# Patient Record
Sex: FEMALE | Race: WHITE | NOT HISPANIC OR LATINO | Employment: UNEMPLOYED
[De-identification: names, ages, dates, MRNs, and addresses within clinical notes are randomized per-mention and may not be internally consistent; named-entity substitution may affect disease eponyms.]

---

## 2017-09-03 ENCOUNTER — HEALTH MAINTENANCE LETTER (OUTPATIENT)
Age: 52
End: 2017-09-03

## 2018-08-07 ENCOUNTER — RECORDS - HEALTHEAST (OUTPATIENT)
Dept: ADMINISTRATIVE | Facility: OTHER | Age: 53
End: 2018-08-07

## 2018-08-17 ENCOUNTER — RECORDS - HEALTHEAST (OUTPATIENT)
Dept: ADMINISTRATIVE | Facility: OTHER | Age: 53
End: 2018-08-17

## 2018-08-17 ENCOUNTER — HOSPITAL ENCOUNTER (OUTPATIENT)
Dept: RADIOLOGY | Facility: HOSPITAL | Age: 53
Discharge: HOME | End: 2018-08-17

## 2018-08-17 DIAGNOSIS — R13.10 DYSPHAGIA: ICD-10-CM

## 2019-06-28 ASSESSMENT — MIFFLIN-ST. JEOR: SCORE: 1856.97

## 2019-07-02 ENCOUNTER — ANESTHESIA - HEALTHEAST (OUTPATIENT)
Dept: SURGERY | Facility: CLINIC | Age: 54
End: 2019-07-02

## 2019-07-02 ENCOUNTER — SURGERY - HEALTHEAST (OUTPATIENT)
Dept: SURGERY | Facility: CLINIC | Age: 54
End: 2019-07-02

## 2019-07-02 ASSESSMENT — MIFFLIN-ST. JEOR: SCORE: 1850.05

## 2019-11-07 ENCOUNTER — HEALTH MAINTENANCE LETTER (OUTPATIENT)
Age: 54
End: 2019-11-07

## 2020-02-23 ENCOUNTER — HEALTH MAINTENANCE LETTER (OUTPATIENT)
Age: 55
End: 2020-02-23

## 2020-11-29 ENCOUNTER — HEALTH MAINTENANCE LETTER (OUTPATIENT)
Age: 55
End: 2020-11-29

## 2021-05-22 ENCOUNTER — HOSPITAL ENCOUNTER (EMERGENCY)
Dept: EMERGENCY MEDICINE | Facility: HOSPITAL | Age: 56
Discharge: HOME | End: 2021-05-23
Attending: EMERGENCY MEDICINE
Payer: COMMERCIAL

## 2021-05-22 DIAGNOSIS — E83.42 HYPOMAGNESEMIA: ICD-10-CM

## 2021-05-22 DIAGNOSIS — I44.2 ACCELERATED IDIOVENTRICULAR RHYTHM (H): ICD-10-CM

## 2021-05-22 LAB
ANION GAP SERPL CALCULATED.3IONS-SCNC: 9 MMOL/L (ref 5–18)
BUN SERPL-MCNC: 14 MG/DL (ref 8–22)
CALCIUM SERPL-MCNC: 8.9 MG/DL (ref 8.5–10.5)
CHLORIDE BLD-SCNC: 111 MMOL/L (ref 98–107)
CO2 SERPL-SCNC: 23 MMOL/L (ref 22–31)
CREAT SERPL-MCNC: 0.82 MG/DL (ref 0.6–1.1)
ERYTHROCYTE [DISTWIDTH] IN BLOOD BY AUTOMATED COUNT: 14.7 % (ref 11–14.5)
GFR SERPL CREATININE-BSD FRML MDRD: >60 ML/MIN/1.73M2
GLUCOSE BLD-MCNC: 126 MG/DL (ref 70–125)
HCT VFR BLD AUTO: 40.9 % (ref 35–47)
HGB BLD-MCNC: 13.2 G/DL (ref 12–16)
MAGNESIUM SERPL-MCNC: 1.6 MG/DL (ref 1.8–2.6)
MCH RBC QN AUTO: 26.5 PG (ref 27–34)
MCHC RBC AUTO-ENTMCNC: 32.3 G/DL (ref 32–36)
MCV RBC AUTO: 82 FL (ref 80–100)
PLATELET # BLD AUTO: 311 THOU/UL (ref 140–440)
PMV BLD AUTO: 9.6 FL (ref 8.5–12.5)
POTASSIUM BLD-SCNC: 4.2 MMOL/L (ref 3.5–5)
RBC # BLD AUTO: 4.98 MILL/UL (ref 3.8–5.4)
SODIUM SERPL-SCNC: 143 MMOL/L (ref 136–145)
TSH SERPL DL<=0.005 MIU/L-ACNC: 1.1 UIU/ML (ref 0.3–5)
WBC: 9.8 THOU/UL (ref 4–11)

## 2021-05-22 ASSESSMENT — MIFFLIN-ST. JEOR: SCORE: 1871.82

## 2021-05-23 LAB
ATRIAL RATE - MUSE: 113 BPM
ATRIAL RATE - MUSE: 75 BPM
DIASTOLIC BLOOD PRESSURE - MUSE: NORMAL
DIASTOLIC BLOOD PRESSURE - MUSE: NORMAL
INTERPRETATION ECG - MUSE: NORMAL
INTERPRETATION ECG - MUSE: NORMAL
P AXIS - MUSE: 55 DEGREES
P AXIS - MUSE: NORMAL
PR INTERVAL - MUSE: 228 MS
PR INTERVAL - MUSE: NORMAL
QRS DURATION - MUSE: 88 MS
QRS DURATION - MUSE: 90 MS
QT - MUSE: 364 MS
QT - MUSE: 422 MS
QTC - MUSE: 471 MS
QTC - MUSE: 471 MS
R AXIS - MUSE: 54 DEGREES
R AXIS - MUSE: 60 DEGREES
SYSTOLIC BLOOD PRESSURE - MUSE: NORMAL
SYSTOLIC BLOOD PRESSURE - MUSE: NORMAL
T AXIS - MUSE: 35 DEGREES
T AXIS - MUSE: 46 DEGREES
VENTRICULAR RATE- MUSE: 101 BPM
VENTRICULAR RATE- MUSE: 75 BPM

## 2021-05-27 ENCOUNTER — RECORDS - HEALTHEAST (OUTPATIENT)
Dept: ADMINISTRATIVE | Facility: CLINIC | Age: 56
End: 2021-05-27

## 2021-05-28 ENCOUNTER — RECORDS - HEALTHEAST (OUTPATIENT)
Dept: ADMINISTRATIVE | Facility: CLINIC | Age: 56
End: 2021-05-28

## 2021-05-30 NOTE — ANESTHESIA POSTPROCEDURE EVALUATION
Patient: Chris Carlin  RIGHT CERVICAL 5-6 CERVICAL DISC REPLACMENT  Anesthesia type: general    Patient location: PACU  Last vitals:   Vitals Value Taken Time   /69 7/2/2019  2:10 PM   Temp 36.8  C (98.3  F) 7/2/2019  1:50 PM   Pulse 98 7/2/2019  2:16 PM   Resp 14 7/2/2019  2:16 PM   SpO2 97 % 7/2/2019  2:16 PM   Vitals shown include unvalidated device data.  Post vital signs: stable  Level of consciousness: awake and responds to simple questions  Post-anesthesia pain: pain controlled  Post-anesthesia nausea and vomiting: no  Pulmonary: unassisted, return to baseline  Cardiovascular: stable and blood pressure at baseline  Hydration: adequate  Anesthetic events: no    QCDR Measures:  ASA# 11 - Macrina-op Cardiac Arrest: ASA11B - Patient did NOT experience unanticipated cardiac arrest  ASA# 12 - Macrina-op Mortality Rate: ASA12B - Patient did NOT die  ASA# 13 - PACU Re-Intubation Rate: ASA13B - Patient did NOT require a new airway mgmt  ASA# 10 - Composite Anes Safety: ASA10A - No serious adverse event    Additional Notes:

## 2021-05-30 NOTE — ANESTHESIA CARE TRANSFER NOTE
Last vitals: T 98.3  Vitals:    07/02/19 1351   BP: 156/72   Pulse: 98   Resp: 15   Temp:    SpO2: 96%     Patient's level of consciousness is awake  Spontaneous respirations: yes  Maintains airway independently: yes  Dentition unchanged: yes  Oropharynx: oropharynx clear of all foreign objects    QCDR Measures:  ASA# 20 - Surgical Safety Checklist: WHO surgical safety checklist completed prior to induction    PQRS# 430 - Adult PONV Prevention: 4558F - Pt received => 2 anti-emetic agents (different classes) preop & intraop  ASA# 8 - Peds PONV Prevention: 4558F - Pt received => 2 anti-emetic agents (different classes) preop & intraop  PQRS# 424 - Macrina-op Temp Management: 4559F - At least one body temp DOCUMENTED => 35.5C or 95.9F within required timeframe  PQRS# 426 - PACU Transfer Protocol: - Transfer of care checklist used  ASA# 14 - Acute Post-op Pain: ASA14B - Patient did NOT experience pain >= 7 out of 10

## 2021-05-30 NOTE — ANESTHESIA PREPROCEDURE EVALUATION
Anesthesia Evaluation      Patient summary reviewed   No history of anesthetic complications     Airway   Mallampati: II  Neck ROM: limited   Pulmonary - normal exam    breath sounds clear to auscultation  (+) asthma  sleep apnea on CPAP, ,   (-) shortness of breath, not a smoker                         Cardiovascular - normal exam  Exercise tolerance: > or = 4 METS  (-) angina, murmur  ECG reviewed  Rhythm: regular  Rate: normal,    no murmur      Neuro/Psych    (+) neuromuscular disease,  depression,     Comments: migraine    Endo/Other    (+) diabetes mellitus, arthritis, obesity,   (-) not pregnant     GI/Hepatic/Renal    (+) GERD,       Comments: PUD          Dental - normal exam                        Anesthesia Plan  Planned anesthetic: general endotracheal    ASA 3   Induction: intravenous   Anesthetic plan and risks discussed with: patient, parent/guardian and child/children  Anesthesia plan special considerations: antiemetics,   Post-op plan: other (ANTONIO orders)

## 2021-06-02 ENCOUNTER — RECORDS - HEALTHEAST (OUTPATIENT)
Dept: ADMINISTRATIVE | Facility: CLINIC | Age: 56
End: 2021-06-02

## 2021-06-03 VITALS — HEIGHT: 64 IN | WEIGHT: 282.2 LBS | BODY MASS INDEX: 48.18 KG/M2

## 2021-06-17 NOTE — ED TRIAGE NOTES
Pt reports hx of SVT. Pt started feeling palpitations at about 19:30. Denies chest pain or shortness of breath. Denies dizziness. Pt took an extra dose of Metoprolol at 19:30.

## 2021-06-17 NOTE — ED PROVIDER NOTES
EMERGENCY DEPARTMENT ENCOUNTER      NAME: Chris Carlin  AGE: 55 y.o. female  YOB: 1965  MRN: [id number]  EVALUATION DATE & TIME: 5/22/2021 10:30 PM    PCP: Eduardo Parnell MD    ED PROVIDER: Mariposa Marte MD    Chief Complaint   Patient presents with     Palpitations         FINAL IMPRESSION:  1. Accelerated idioventricular rhythm (H)    2. Hypomagnesemia          ED COURSE & MEDICAL DECISION MAKING:    10:33 PM Met with patient for initial interview and exam. Plan for care in the ED was discussed. I was wearing PPE throughout this encounter including surgical mask, goggles and gloves.    11:55 PM I reevaluated the patient and updated her on results. Plan for discharge discussed. Patient is agreeable.     Pertinent Labs & Imaging studies reviewed. (See chart for details)  55 y.o. female with history of DM, ANTONIO, GERD who presents to the Emergency Department for evaluation of palpitations since this evening.  Really no other associated symptoms.  Did have a GI illness earlier in the week with diarrhea resolved.  Differential includes arrhythmia, dehydration, electrolyte abnormality, thyroid abnormality.    Patient placed on monitor, IV established and blood obtained.  Twelve-lead EKG shows accelerated ventricular rhythm.  Patient is on metoprolol.  Given additional dose of 5 mg metoprolol IV here.  CBC, BMP, magnesium and TSH notable for mild hypomagnesemia replaced IV.  After the metoprolol patient's heart rate normalized and her rhythm converted to sinus rhythm.  Will be discharged home to follow-up with her cardiologist to [facility].    At the conclusion of the encounter I discussed the results of all of the tests and the disposition. The questions were answered. The patient or family acknowledged understanding and was agreeable with the care plan.       MEDICATIONS GIVEN IN THE EMERGENCY:  Medications   sodium chloride flush 10 mL (NS) (has no administration in time range)    magnesium sulfate in water 2 gram/50 mL (4 %) IVPB 2 g (2 g Intravenous New Bag 5/22/21 4205)   metoprolol tartrate injection 5 mg (LOPRESSOR) (5 mg Intravenous Given 5/22/21 4306)       NEW PRESCRIPTIONS STARTED AT TODAY'S ER VISIT  Current Discharge Medication List      CONTINUE these medications which have NOT CHANGED    Details   acetaminophen (TYLENOL) 500 MG tablet Take 1,000 mg by mouth every 6 (six) hours as needed for pain.      albuterol (PROVENTIL HFA;VENTOLIN HFA) 90 mcg/actuation inhaler Inhale 2 puffs every 4 (four) hours as needed for wheezing.      azelaic acid (FINACEA) 15 % gel Apply topically 2 (two) times a day. After skin is thoroughly washed and patted dry, gently but thoroughly massage a thin film of azelaic acid cream into the affected area twice daily, in the morning and evening.      cetirizine (ZYRTEC) 10 MG tablet Take 10 mg by mouth daily.      cholecalciferol, vitamin D3, 1,000 unit tablet Take 3,000 Units by mouth daily.      docusate sodium (COLACE) 100 MG capsule Take 1 capsule (100 mg total) by mouth 2 (two) times a day as needed for constipation.  Refills: 0    Associated Diagnoses: Status post cervical disc replacement      esomeprazole (NEXIUM) 40 MG capsule Take 40 mg by mouth daily before breakfast.             ESTROGENS, CONJUGATED (PREMARIN VAGL) Insert 1 application into the vagina daily as needed.      gabapentin (NEURONTIN) 300 MG capsule Take 600 mg by mouth 2 (two) times a day.             metoprolol tartrate (LOPRESSOR) 50 MG tablet Take 50 mg by mouth daily as needed (uses only prn for SVT).       metroNIDAZOLE (METROGEL) 0.75 % gel Apply 1 application topically daily as needed.             OMEGA-3/DHA/EPA/FISH OIL (FISH OIL-OMEGA-3 FATTY ACIDS) 300-1,000 mg capsule Take 1 g by mouth daily.      oxybutynin (DITROPAN XL) 10 MG ER tablet Take 10 mg by mouth daily.      oxyCODONE (ROXICODONE) 5 MG immediate release tablet Take 1-2 tablets (5-10 mg total) by mouth every  4 (four) hours as needed.  Qty: 30 tablet, Refills: 0    Associated Diagnoses: Status post cervical disc replacement      PARoxetine (PAXIL) 40 MG tablet Take 40 mg by mouth every morning.      polyethylene glycol (MIRALAX) 17 gram packet Take 1 packet (17 g total) by mouth daily as needed.  Refills: 0    Associated Diagnoses: Status post cervical disc replacement      propylene glycol (SYSTANE COMPLETE) 0.6 % Drop Administer 1 drop to both eyes 4 (four) times a day as needed.      selenium sulfide 2.25 % Sham Apply 1 application topically as needed (2 times per week.).       simethicone 125 mg Tab Take 1 capsule by mouth 4 (four) times a day as needed.      sulfacetamide sodium 10 % Susp Apply topically 2 (two) times a day.      tretinoin (RETIN-A) 0.1 % cream Apply 1 application topically every other day. At bedtime.      triamcinolone (KENALOG) 0.5 % cream Apply 1 application topically 3 (three) times a day as needed.             vortioxetine (TRINTELLIX) 10 mg Tab tablet Take 10 mg by mouth daily.                =================================================================    HPI    Patient information was obtained from: Patient    Use of Intrepreter: N/A        Chris Carlin is a 55 y.o. female with pertinent history of SVT (history of previous cardioversion), asthma, diabetes, heart murmur, who presents to the emergency department via walk in for evaluation of palpitations.    Patient reports onset of palpitations around 1930 tonight. She reports that her heart rate at home was 115 bpm. She took an extra dose of metoprolol tartrate at 1930 with mild improvement. She notes some mild shortness of breath. She denies any lightheadedness, dizziness or chest pain. She notes previous history of SVT and has required cardioversion once. No history of atrial fibrillation or thyroid issues.     She does note that she recently had COVID-19. She reports that her appetite has improved and diarrhea, other symptoms have  resolved.     No other physical complaints.     REVIEW OF SYSTEMS   Constitutional:  Denies fever, chills, weight loss or weakness  HENT:  Denies sore throat, ear pain, congestion   Respiratory: No wheeze or cough.Positive for mild shortness of breath.  Cardiovascular:  No CP. Positive for palpitations.  GI:  Denies abdominal pain, nausea, vomiting, diarrhea  All other systems negative unless noted in HPI.      PAST MEDICAL HISTORY:  Past Medical History:   Diagnosis Date     Acid reflux      Acute blood loss anemia      Allergic rhinitis      Anemia      Asthma      Depression      Diabetes mellitus (H)     pre diabetic     DJD (degenerative joint disease)      Heart murmur      Migraine without intractable migraine      Overactive bladder      Prediabetes      Rosacea      Sleep apnea     CPAP nightly       PAST SURGICAL HISTORY:  Past Surgical History:   Procedure Laterality Date      SECTION       DILATION AND CURETTAGE OF UTERUS       HYSTERECTOMY       perforated stomach ulcer surgery       IA KNEE SCOPE,SINGLE MENISECTOMY Right 2015    Procedure: KNEE ARTHROSCOPY, RIGHT Removal of Loose Bodies and Partial Medial Meniscectomy ;  Surgeon: Ciro Frances MD;  Location: [facility];  Service: Orthopedics     PUBOVAGINAL SLING       TONSILLECTOMY             CURRENT MEDICATIONS:    No current facility-administered medications on file prior to encounter.      Current Outpatient Medications on File Prior to Encounter   Medication Sig     acetaminophen (TYLENOL) 500 MG tablet Take 1,000 mg by mouth every 6 (six) hours as needed for pain.     albuterol (PROVENTIL HFA;VENTOLIN HFA) 90 mcg/actuation inhaler Inhale 2 puffs every 4 (four) hours as needed for wheezing.     azelaic acid (FINACEA) 15 % gel Apply topically 2 (two) times a day. After skin is thoroughly washed and patted dry, gently but thoroughly massage a thin film of azelaic acid cream into the affected area twice daily, in the morning  and evening.     cetirizine (ZYRTEC) 10 MG tablet Take 10 mg by mouth daily.     cholecalciferol, vitamin D3, 1,000 unit tablet Take 3,000 Units by mouth daily.     docusate sodium (COLACE) 100 MG capsule Take 1 capsule (100 mg total) by mouth 2 (two) times a day as needed for constipation.     esomeprazole (NEXIUM) 40 MG capsule Take 40 mg by mouth daily before breakfast.            ESTROGENS, CONJUGATED (PREMARIN VAGL) Insert 1 application into the vagina daily as needed.     gabapentin (NEURONTIN) 300 MG capsule Take 600 mg by mouth 2 (two) times a day.            metoprolol tartrate (LOPRESSOR) 50 MG tablet Take 50 mg by mouth daily as needed (uses only prn for SVT).      metroNIDAZOLE (METROGEL) 0.75 % gel Apply 1 application topically daily as needed.            OMEGA-3/DHA/EPA/FISH OIL (FISH OIL-OMEGA-3 FATTY ACIDS) 300-1,000 mg capsule Take 1 g by mouth daily.     oxybutynin (DITROPAN XL) 10 MG ER tablet Take 10 mg by mouth daily.     oxyCODONE (ROXICODONE) 5 MG immediate release tablet Take 1-2 tablets (5-10 mg total) by mouth every 4 (four) hours as needed.     PARoxetine (PAXIL) 40 MG tablet Take 40 mg by mouth every morning.     polyethylene glycol (MIRALAX) 17 gram packet Take 1 packet (17 g total) by mouth daily as needed.     propylene glycol (SYSTANE COMPLETE) 0.6 % Drop Administer 1 drop to both eyes 4 (four) times a day as needed.     selenium sulfide 2.25 % Sham Apply 1 application topically as needed (2 times per week.).      simethicone 125 mg Tab Take 1 capsule by mouth 4 (four) times a day as needed.     sulfacetamide sodium 10 % Susp Apply topically 2 (two) times a day.     tretinoin (RETIN-A) 0.1 % cream Apply 1 application topically every other day. At bedtime.     triamcinolone (KENALOG) 0.5 % cream Apply 1 application topically 3 (three) times a day as needed.            vortioxetine (TRINTELLIX) 10 mg Tab tablet Take 10 mg by mouth daily.       ALLERGIES:  Allergies   Allergen Reactions      Nitrofurantoin Macrocrystal Hives     Adhesive Tape-Silicones Other (See Comments)     redness     Nickel Other (See Comments)     cysts     Penicillins Hives     Sulfa (Sulfonamide Antibiotics) Hives       FAMILY HISTORY:  Family History   Problem Relation Age of Onset     Diabetes Mother      Anesthesia problems Neg Hx        SOCIAL HISTORY:   Social History     Socioeconomic History     Marital status: Single     Spouse name: Not on file     Number of children: Not on file     Years of education: Not on file     Highest education level: Not on file   Occupational History     Not on file   Social Needs     Financial resource strain: Not on file     Food insecurity     Worry: Not on file     Inability: Not on file     Transportation needs     Medical: Not on file     Non-medical: Not on file   Tobacco Use     Smoking status: Never Smoker     Smokeless tobacco: Never Used   Substance and Sexual Activity     Alcohol use: Yes     Comment: Rare     Drug use: No     Sexual activity: Not on file   Lifestyle     Physical activity     Days per week: Not on file     Minutes per session: Not on file     Stress: Not on file   Relationships     Social connections     Talks on phone: Not on file     Gets together: Not on file     Attends Denominational service: Not on file     Active member of club or organization: Not on file     Attends meetings of clubs or organizations: Not on file     Relationship status: Not on file     Intimate partner violence     Fear of current or ex partner: Not on file     Emotionally abused: Not on file     Physically abused: Not on file     Forced sexual activity: Not on file   Other Topics Concern     Not on file   Social History Narrative     Not on file       VITALS:  Patient Vitals for the past 24 hrs:   BP Temp Temp src Pulse Resp SpO2 Height Weight   05/22/21 2300 117/59 -- -- 75 16 97 % -- --   05/22/21 2249 -- -- -- -- 16 -- -- --   05/22/21 2248 160/68 -- -- 99 -- 97 % -- --   05/22/21  "2235 -- 98  F (36.7  C) Oral -- 16 -- 5' 4\" (1.626 m) (!) 287 lb (130.2 kg)   05/22/21 2234 -- -- -- (!) 101 -- 98 % -- --   05/22/21 2232 145/67 -- -- (!) 104 -- 98 % -- --       PHYSICAL EXAM    General Appearance: Well-appearing, well-nourished, no acute distress. Obese female.  Head:  Normocephalic  Eyes:   conjunctiva/corneas clear  ENT:  membranes are moist without pallor  Neck:  Supple  Cardio:  Mild tachycardia 100s-110s. Regular rhythm, no murmur/gallop/rub, 2+ pulses symmetric in all extremities  Pulm:  No respiratory distress, clear to auscultation bilaterally  Back:  No CVA tenderness, normal ROM  Abdomen: Morbidly obese  Extremities: Moves all extremities normally, normal gait  Skin:  Skin warm, dry, no rashes  Neuro:  Alert and oriented ×3, moving all extremities, no gross sensory defects       LAB:  All pertinent labs reviewed and interpreted.  Results for orders placed or performed during the hospital encounter of 05/22/21   Basic metabolic panel   Result Value Ref Range    Sodium 143 136 - 145 mmol/L    Potassium 4.2 3.5 - 5.0 mmol/L    Chloride 111 (H) 98 - 107 mmol/L    CO2 23 22 - 31 mmol/L    Anion Gap, Calculation 9 5 - 18 mmol/L    Glucose 126 (H) 70 - 125 mg/dL    Calcium 8.9 8.5 - 10.5 mg/dL    BUN 14 8 - 22 mg/dL    Creatinine 0.82 0.60 - 1.10 mg/dL    GFR MDRD Af Amer >60 >60 mL/min/1.73m2    GFR MDRD Non Af Amer >60 >60 mL/min/1.73m2   Magnesium   Result Value Ref Range    Magnesium 1.6 (L) 1.8 - 2.6 mg/dL   CBC   Result Value Ref Range    WBC 9.8 4.0 - 11.0 thou/uL    RBC 4.98 3.80 - 5.40 mill/uL    Hemoglobin 13.2 12.0 - 16.0 g/dL    Hematocrit 40.9 35.0 - 47.0 %    MCV 82 80 - 100 fL    MCH 26.5 (L) 27.0 - 34.0 pg    MCHC 32.3 32.0 - 36.0 g/dL    RDW 14.7 (H) 11.0 - 14.5 %    Platelets 311 140 - 440 thou/uL    MPV 9.6 8.5 - 12.5 fL   Thyroid Cascade   Result Value Ref Range    TSH 1.10 0.30 - 5.00 uIU/mL       EKG:    Performed at: 22:33    Impression: accelerated junctional rhythm " with retrograde conduction, ST abnormality, nonspecific.     Rate: 101  Rhythm: accelerated junctional rhythm  SC Interval: *  QRS Interval: 88  QTc Interval: 471  Comparison: compared to previous on 1/21/2021, heart rate is slower    Performed at: 00:25    Impression: sinus rhythm with 1st degree AV block, low voltage QRS    Rate: 75  Rhythm: sinus rhythm  SC Interval: 228  QRS Interval: 90  QTc Interval: 471  Comparison: Normal sinus rhythm has replaced junctional rhythm    I have independently reviewed and interpreted the EKG(s) documented above.      I, Cirilo East, am serving as a scribe to document services personally performed by Dr. Marte based on my observation and the provider's statements to me. I, Mariposa Marte MD attest that Cirilo East is acting in a scribe capacity, has observed my performance of the services and has documented them in accordance with my direction.    Mariposa Marte MD  Emergency Medicine  [facility] EMERGENCY DEPARTMENT  [address]  [facility] [address]  Dept: [phone]  Loc: [phone]        Mariposa Marte MD  05/23/21 0016

## 2021-06-19 NOTE — PROGRESS NOTES
"Speech Language/Pathology  Videofluoroscopic Swallow Study       Problem:  Patient Active Problem List   Diagnosis     S/P total knee arthroplasty     Acid reflux     Sleep apnea     Depression     Asthma     Anemia     Heart murmur     Acute blood loss anemia       Onset Date: 8/7/2018 (order date)  Reason for Evaluation: Assess for dysphagia  Pertinent History: GERD. Patient reports that she takes 40 mg esomeprazole daily for this.  Current Diet: Regular textures and thin liquids  Baseline Diet: Regular textures and thin liquids    Patient is a 53-year-old female referred due to concerns of dysphagia. She reports that \"chewy things\" (e.g., gummy bears) frequently feel as though they get stuck in her throat. She points to her suprasternal notch to indicate where this sensation occurs. Patient also notes that she occasionally \"inhales\" items and then begins to cough. She has experienced this with grains of rice. The purpose of this study is to evaluate the oropharyngeal phase of patient's swallow, determine her aspiration risk, and establish safe swallowing precautions as indicated. Patient also participated in an esophagram during this visit. Please refer to separate report for details.     Patient presents as alert and cooperative during this evaluation.   An  was not applicable    Patient was given nectar-thick, thin, and pureed consistencies of barium.    Oral Phase:    Dentition/Oral hygiene: Patient's dentition is intact. Oral hygiene was good.    Bolus prep and oral control were not impaired.     Anterior-Posterior transit was not impaired.    Premature spillage did not occur with any consistency.    Tongue base retraction was not impaired.    Oral stasis did not occur with any consistency.    Pharyngeal Phase:    Aspiration did not occur with any consistency.     Laryngeal penetration did not occur with any consistency.    Swallow response was timely with all consistencies. Pourover was not " observed with any consistency.    Epiglottic movement was complete consistently across texture trials.    Pharyngeal stasis did not occur with any consistency.    Pharyngeal constriction was not impaired.    Hyolaryngeal elevation was mildly reduced. Hyolaryngeal excursion was mildly reduced.    Cricopharyngeal function was not impaired. Cervical esophageal function was not impaired.    Assessment:    Patient demonstrated no oral dysphagia and no pharyngeal dysphagia.    Patient is at minimal aspiration risk with all intake.    Rehab potential is good based on evaluation results.    Recommendations:    Plan: Continue current diet of Regular textures and thin liquids    Strategies: Patient to follow standard safe swallowing precautions, including sitting fully upright for all intake, eating slowly, and taking small bites and sips. Patient to avoid talking during meals due to increased risk of aspiration with small food particles. Recommend that patient also follow standard GERD precautions. She was provided with verbal education and written handouts on these.    Speech Therapy is not indicated at this time.    Referrals: Patient to continue to follow with [facility] for ongoing symptom management     Reviewed history of swallow problem with patient and verbally explained roles of SLP and radiologist. Verbally explained process of VFSS prior to administration of barium. Verbally explained results and recommendations to patient. SLP answered questions and stated that a copy of this report will be faxed to patient's referring physician, Dr. Valdez of [facility]. Patient verbalized understanding.    25 dysphagia minutes    Margi Landeros MA, CCC-SLP

## 2021-07-06 VITALS — HEIGHT: 64 IN | BODY MASS INDEX: 49 KG/M2 | WEIGHT: 287 LBS

## 2021-09-25 ENCOUNTER — HEALTH MAINTENANCE LETTER (OUTPATIENT)
Age: 56
End: 2021-09-25

## 2021-11-20 ENCOUNTER — HEALTH MAINTENANCE LETTER (OUTPATIENT)
Age: 56
End: 2021-11-20

## 2022-01-15 ENCOUNTER — HEALTH MAINTENANCE LETTER (OUTPATIENT)
Age: 57
End: 2022-01-15

## 2022-04-22 ENCOUNTER — HOSPITAL ENCOUNTER (EMERGENCY)
Facility: HOSPITAL | Age: 57
Discharge: HOME | End: 2022-04-22
Attending: EMERGENCY MEDICINE | Admitting: EMERGENCY MEDICINE
Payer: COMMERCIAL

## 2022-04-22 VITALS
BODY MASS INDEX: 50.64 KG/M2 | TEMPERATURE: 97.7 F | WEIGHT: 293 LBS | DIASTOLIC BLOOD PRESSURE: 87 MMHG | SYSTOLIC BLOOD PRESSURE: 159 MMHG | RESPIRATION RATE: 18 BRPM | OXYGEN SATURATION: 99 % | HEART RATE: 78 BPM

## 2022-04-22 DIAGNOSIS — S05.01XA ABRASION OF RIGHT CORNEA, INITIAL ENCOUNTER: ICD-10-CM

## 2022-04-22 PROCEDURE — 99283 EMERGENCY DEPT VISIT LOW MDM: CPT

## 2022-04-22 PROCEDURE — 250N000009 HC RX 250: Performed by: EMERGENCY MEDICINE

## 2022-04-22 RX ORDER — TOBRAMYCIN 3 MG/ML
2 SOLUTION/ DROPS OPHTHALMIC 4 TIMES DAILY
Qty: 2 ML | Refills: 0 | Status: SHIPPED | OUTPATIENT
Start: 2022-04-22 | End: 2022-04-27

## 2022-04-22 RX ORDER — TETRACAINE HYDROCHLORIDE 5 MG/ML
2 SOLUTION OPHTHALMIC ONCE
Status: COMPLETED | OUTPATIENT
Start: 2022-04-22 | End: 2022-04-22

## 2022-04-22 RX ADMIN — TETRACAINE HYDROCHLORIDE 2 DROP: 5 SOLUTION OPHTHALMIC at 18:54

## 2022-04-22 RX ADMIN — FLUORESCEIN SODIUM 1 STRIP: 1 STRIP OPHTHALMIC at 20:41

## 2022-04-22 NOTE — ED PROVIDER NOTES
ED Provider In Triage Note  [facility]  Encounter Date: Apr 22, 2022    Chief Complaint   Patient presents with     Eye Pain       Brief HPI:   Chris Carlin is a 56 year old female presenting to the Emergency Department with a chief complaint of R eye pain. Possible scratch to eye. Started around 4. No obvious FB. Wears contacts    Brief Physical Exam:  There were no vitals taken for this visit.  General: Non-toxic appearing. PERRL. Minimal photophobia  HEENT: R eye mildly injected  Resp: No respiratory distress  Neuro: Alert, oriented, answers questions appropriately  Psych: Behavior appropriate      Plan Initiated in Triage:  Orders Placed This Encounter     tetracaine (PONTOCAINE) 0.5 % ophthalmic solution 2 drop       PIT Dispo:   RP    Varinder Yousif MD on 4/22/2022 at 6:48 PM    Patient was evaluated by the Physician in Triage due to a limitation of available rooms in the Emergency Department. A plan of care was discussed based on the information obtained on the initial evaluation and patient was consuled to return back to the Emergency Department lobby after this initial evalutaiton until results were obtained or a room became available in the Emergency Department. Patient was counseled not to leave prior to receiving the results of their workup.      Varinder Yousif MD  04/22/22 0196

## 2022-04-22 NOTE — ED TRIAGE NOTES
Started this 1615 PM to have right eye irritation, burning pain.  Thinks it might have a corneal scratch.   R Eye is reddened

## 2022-04-23 NOTE — ED PROVIDER NOTES
EMERGENCY DEPARTMENT ENCOUNTER      NAME: Chris Carlin  AGE: 56 year old female  YOB: 1965  MRN: [id number]  EVALUATION DATE & TIME: 2022  8:14 PM    PCP: Eduardo Parnell    ED PROVIDER: Chen Galvan M.D.      Chief Complaint   Patient presents with     Eye Pain     FINAL IMPRESSION:  1. Abrasion of right cornea, initial encounter      ED COURSE & MEDICAL DECISION MAKIN:29 PM I met with the patient for the initial interview and physical examination. Discussed plan for treatment and workup in the ED.      Pertinent Labs & Imaging studies reviewed. (See chart for details)  ED Course as of 22 Patient is a 56-year-old female who comes in today for evaluation of pain in the right eye.  She wears gas-permeable contacts and feels like something got in there.  It feels like a corneal abrasion.  She is had those before.  She has little sensitivity.  She had some tetracaine placed out in triage and it helped quite a bit.  I did fluorescein stain it and she does have just a punctate abrasion at the 3 o'clock position of the right eye.  We talked about antibiotic drops and not wearing her contacts over the weekend.  I told her if she is not doing significantly better or resolved by Monday then she needs to see the eye doctor because she is at risk of getting a more severe infection.  She is comfortable with that plan.  She otherwise looks well and we will get her discharged home.  Because she is a contact lens user we will cover her with tobramycin drops.       At the conclusion of the encounter I discussed  the results of all of the tests and the disposition with patient.   All questions were answered.  The patient acknowledged understanding and was involved in the decision making regarding the overall care plan.      I discussed with patient the utility, limitations and findings of the exam/interventions/studies done during this visit as well as the list of  differential diagnosis and symptoms to monitor/return to ER for.  Additional verbal discharge instructions were provided.     MEDICATIONS GIVEN IN THE EMERGENCY:  Medications   tetracaine (PONTOCAINE) 0.5 % ophthalmic solution 2 drop (2 drops Right Eye Given 4/22/22 1854)   fluorescein (FUL-LEX) ophthalmic strip 1 strip (1 strip Both Eyes Given by Other 4/22/22 2041)       NEW PRESCRIPTIONS STARTED AT TODAY'S ER VISIT  Discharge Medication List as of 4/22/2022  8:51 PM      START taking these medications    Details   tobramycin (TOBREX) 0.3 % ophthalmic solution Place 2 drops into the right eye 4 times daily for 5 days, Disp-2 mL, R-0, E-Prescribe                =================================================================    HPI    Triage Note: Started this 1615 PM to have right eye irritation, burning pain.  Thinks it might have a corneal scratch.   R Eye is reddened      Patient information was obtained from: Patient     Use of : N/A        Chris Carlin is a 56 year old female with a pertinent medical history of migraine who presents to this ED by walk in for evaluation of right eye pain.    Patient reports that she developed right eye irritation and burning pain around 4 PM today. She is concerned for a scratch to her eye. She states that she wears contact lenses, and she thinks that something might have gotten stuck under her contact lens. Pain improved after tetracaine drops that were given in triage. No other reported symptoms or complaints at this time.         REVIEW OF SYSTEMS   Except as stated in the HPI all other systems reviewed and are negative.    PAST MEDICAL HISTORY:  Past Medical History:   Diagnosis Date     Acid reflux      Acne      Acute blood loss anemia      Adenomatous polyp of colon      Alcohol abuse, in remission      Allergic rhinitis      Anemia      Anemia      Asthma      Asthma      Cardiac murmur      Depression      Depression, major, recurrent (H)      Diabetes  mellitus (H)     pre diabetic     DJD (degenerative joint disease)      Genital warts      GERD (gastroesophageal reflux disease)      Heart murmur      Migraine      Migraine without intractable migraine      ANTONIO (obstructive sleep apnea)      Overactive bladder      Perforated, severe stomach ulcer (H)      Pre-diabetes      Prediabetes      Rosacea      Sleep apnea     CPAP nightly     SVT (supraventricular tachycardia) (H)     reports intermittent, occurs once per year       PAST SURGICAL HISTORY:  Past Surgical History:   Procedure Laterality Date     CERVICECTOMY (TRACHELECTOMY)  2012    Procedure: CERVICECTOMY (TRACHELECTOMY);  Vaginal Tracheolectomy;  Surgeon: Michelle Bravo MD;  Location: [facility] OR      SECTION      [email or web address]      SECTION       DILATION AND CURETTAGE       DILATION AND CURETTAGE       HC KNEE SCOPE,SINGLE MENISECTOMY Right 2015    Procedure: KNEE ARTHROSCOPY, RIGHT Removal of Loose Bodies and Partial Medial Meniscectomy ;  Surgeon: Ciro Frances MD;  Location: [facility];  Service: Orthopedics     HYSTERECTOMY       LAPAROSCOPIC HYSTERECTOMY SUPRACERVICAL  2011    ovaries retained     OTHER SURGICAL HISTORY      perforated stomach ulcer surgery     perforated stomach ulcer  2000    at [facility]     pubovaginal sling  2011     PUBOVAGINAL SLING       REPAIR BLADDER      as a child     TONSILLECTOMY       TONSILLECTOMY & ADENOIDECTOMY       RUST HAND/FINGER SURGERY UNLISTED      x 3       CURRENT MEDICATIONS:    No current facility-administered medications for this encounter.    Current Outpatient Medications:      tobramycin (TOBREX) 0.3 % ophthalmic solution, Place 2 drops into the right eye 4 times daily for 5 days, Disp: 2 mL, Rfl: 0     albuterol 90 MCG/ACT inhaler, Inhale 2 puffs into the lungs every 6 hours as needed., Disp: , Rfl:      cetirizine (ZYRTEC) 10 MG tablet, Take 10 mg by mouth daily., Disp: , Rfl:      cholecalciferol  (VITAMIN D3) 1000 UNITS capsule, Take 1 capsule by mouth daily., Disp: , Rfl:      coenzyme Q-10 200 MG CAPS, Take  by mouth., Disp: , Rfl:      desoximetasone (TOPICORT) 0.25 % cream, Apply 0.5 inches topically 2 times daily. prn, Disp: , Rfl:      hydroquinone 4 % CREA, Externally apply  topically. Prn , Disp: , Rfl:      ibuprofen (ADVIL,MOTRIN) 600 MG tablet, Take 1 tablet by mouth every 6 hours as needed for other (inflammatory pain)., Disp: 40 tablet, Rfl: 0     ISOtretinoin (ACCUTANE) 40 MG capsule, Take 40 mg by mouth 2 times daily., Disp: , Rfl:      LANsoprazole (PREVACID SOLUTAB) 30 MG disintegrating tablet, Take  by mouth daily., Disp: , Rfl:      metoprolol (TOPROL-XL) 25 MG 24 hr tablet, Take 25 mg by mouth daily. prn, Disp: , Rfl:      PARoxetine (PAXIL) 20 MG tablet, Take  by mouth every morning. 2 1/2 tab, Disp: , Rfl:      senna-docusate (SENOKOT-S;PERICOLACE) 8.6-50 MG per tablet, Take 1-2 tablets by mouth 2 times daily., Disp: 60 tablet, Rfl: 5     Simethicone 125 MG TABS, Take  by mouth. 1 - 4 times a day prn, Disp: , Rfl:     ALLERGIES:  Allergies   Allergen Reactions     Nitrofurantoin Hives and Rash     Penicillin G Difficulty breathing     Sulfa Drugs Hives       FAMILY HISTORY:  Family History   Problem Relation Age of Onset     Sjogren's Mother      Lupus Other      Breast Cancer No family hx of      Cancer - colorectal No family hx of      Diabetes Mother      Anesthesia Reaction No family hx of        SOCIAL HISTORY:   Social History     Socioeconomic History     Marital status: Single   Tobacco Use     Smoking status: Never Smoker     Smokeless tobacco: Never Used   Substance and Sexual Activity     Alcohol use: Yes     Comment: Alcoholic Drinks/day: Rare     Drug use: No     Sexual activity: Never     Partners: Male       PHYSICAL EXAM    VITAL SIGNS: BP (!) 159/87   Pulse 78   Temp 97.7  F (36.5  C) (Temporal)   Resp 18   Wt 133.8 kg (295 lb)   SpO2 99%   BMI 50.64 kg/m      GENERAL: Awake, Alert, answering questions, No acute distress, Well nourished  HEENT: Normal cephalic, Atraumatic, bilateral external ears normal. Very small corneal abrasion at the 9 o'clock position over the pupil of the right eye, small amount of fluorescein uptake.  Bilateral retinal vessels are crisp.  Mild conjunctival irritation of the right eye and normal conjunctiva of the left eye. extraocular movements intact.  Mask in place  NECK: No obvious swelling or abnormality, No stridor  EXTREMITIES: Moves all extremities spontaneously, warm, no edema, No major deformities  NEURO: No facial droop, normal motor function, Normal speech   PSYCH: Normal mood and affect  SKIN: No rashes on visualized skin, dry, warm     I, Merissa Gaitan, am serving as a scribe to document services personally performed by Dr. Galvan based on my observation and the provider's statements to me. I, Chen Galvan MD attest that Merissa Gaitan is acting in a scribe capacity, has observed my performance of the services and has documented them in accordance with my direction.    Chen Galvan M.D.  Emergency Medicine  [facility] EMERGENCY DEPARTMENT  [facility] [facility] [address]  [phone]  Dept: [phone]     Chen Galvan MD  04/22/22 4042

## 2022-04-23 NOTE — DISCHARGE INSTRUCTIONS
You were seen in the Emergency Department today for evaluation of right eye pain.  Your exam shows a corneal abrasion.  You were prescribed antibiotic drops. You should take all medications as prescribed.  Follow up with your primary care physician to ensure resolution of symptoms. Return if you have new or worsening symptoms.

## 2022-12-26 ENCOUNTER — HEALTH MAINTENANCE LETTER (OUTPATIENT)
Age: 57
End: 2022-12-26

## 2023-04-16 ENCOUNTER — HEALTH MAINTENANCE LETTER (OUTPATIENT)
Age: 58
End: 2023-04-16

## 2023-04-18 ENCOUNTER — ANCILLARY ORDERS (OUTPATIENT)
Dept: CT IMAGING | Facility: HOSPITAL | Age: 58
End: 2023-04-18

## 2023-04-18 DIAGNOSIS — Z86.0100 PERSONAL HISTORY OF COLONIC POLYPS: ICD-10-CM

## 2023-04-18 DIAGNOSIS — R10.33 INTERMITTENT PERIUMBILICAL ABDOMINAL PAIN: ICD-10-CM

## 2023-04-18 DIAGNOSIS — Z90.49 HISTORY OF CHOLECYSTECTOMY: ICD-10-CM

## 2023-04-22 ENCOUNTER — HOSPITAL ENCOUNTER (OUTPATIENT)
Dept: CT IMAGING | Facility: HOSPITAL | Age: 58
Discharge: HOME | End: 2023-04-22
Attending: INTERNAL MEDICINE | Admitting: INTERNAL MEDICINE
Payer: COMMERCIAL

## 2023-04-22 DIAGNOSIS — Z90.49 HISTORY OF CHOLECYSTECTOMY: ICD-10-CM

## 2023-04-22 DIAGNOSIS — Z86.0100 PERSONAL HISTORY OF COLONIC POLYPS: ICD-10-CM

## 2023-04-22 DIAGNOSIS — R10.33 INTERMITTENT PERIUMBILICAL ABDOMINAL PAIN: ICD-10-CM

## 2023-04-22 LAB
CREAT BLD-MCNC: 0.8 MG/DL (ref 0.6–1.1)
GFR SERPL CREATININE-BSD FRML MDRD: >60 ML/MIN/1.73M2

## 2023-04-22 PROCEDURE — 999N000127 HC STATISTIC PERIPHERAL IV START W US GUIDANCE

## 2023-04-22 PROCEDURE — 250N000011 HC RX IP 250 OP 636: Performed by: INTERNAL MEDICINE

## 2023-04-22 PROCEDURE — 74177 CT ABD & PELVIS W/CONTRAST: CPT

## 2023-04-22 PROCEDURE — 999N000285 HC STATISTIC VASC ACCESS LAB DRAW WITH PIV START

## 2023-04-22 PROCEDURE — 82565 ASSAY OF CREATININE: CPT

## 2023-04-22 RX ORDER — IOPAMIDOL 755 MG/ML
90 INJECTION, SOLUTION INTRAVASCULAR ONCE
Status: COMPLETED | OUTPATIENT
Start: 2023-04-22 | End: 2023-04-22

## 2023-04-22 RX ADMIN — IOPAMIDOL 90 ML: 755 INJECTION, SOLUTION INTRAVENOUS at 16:37

## 2023-05-06 ENCOUNTER — APPOINTMENT (OUTPATIENT)
Dept: CT IMAGING | Facility: HOSPITAL | Age: 58
End: 2023-05-06
Attending: EMERGENCY MEDICINE
Payer: COMMERCIAL

## 2023-05-06 ENCOUNTER — HOSPITAL ENCOUNTER (EMERGENCY)
Facility: HOSPITAL | Age: 58
Discharge: HOME | End: 2023-05-06
Attending: EMERGENCY MEDICINE | Admitting: EMERGENCY MEDICINE
Payer: COMMERCIAL

## 2023-05-06 VITALS
WEIGHT: 285 LBS | OXYGEN SATURATION: 97 % | SYSTOLIC BLOOD PRESSURE: 128 MMHG | TEMPERATURE: 98 F | HEART RATE: 81 BPM | BODY MASS INDEX: 48.92 KG/M2 | DIASTOLIC BLOOD PRESSURE: 68 MMHG | RESPIRATION RATE: 16 BRPM

## 2023-05-06 DIAGNOSIS — R10.84 ABDOMINAL PAIN, GENERALIZED: ICD-10-CM

## 2023-05-06 LAB
ALBUMIN SERPL BCG-MCNC: 3.6 G/DL (ref 3.5–5.2)
ALBUMIN UR-MCNC: 10 MG/DL
ALP SERPL-CCNC: 82 U/L (ref 35–104)
ALT SERPL W P-5'-P-CCNC: 21 U/L (ref 10–35)
ANION GAP SERPL CALCULATED.3IONS-SCNC: 10 MMOL/L (ref 7–15)
APPEARANCE UR: CLEAR
AST SERPL W P-5'-P-CCNC: 31 U/L (ref 10–35)
BACTERIA #/AREA URNS HPF: ABNORMAL /HPF
BILIRUB DIRECT SERPL-MCNC: <0.2 MG/DL (ref 0–0.3)
BILIRUB SERPL-MCNC: 0.3 MG/DL
BILIRUB UR QL STRIP: NEGATIVE
BUN SERPL-MCNC: 12.3 MG/DL (ref 8–23)
CALCIUM SERPL-MCNC: 9.4 MG/DL (ref 8.6–10)
CHLORIDE SERPL-SCNC: 103 MMOL/L (ref 98–107)
COLOR UR AUTO: ABNORMAL
CREAT SERPL-MCNC: 0.79 MG/DL (ref 0.51–0.95)
DEPRECATED HCO3 PLAS-SCNC: 24 MMOL/L (ref 22–29)
ERYTHROCYTE [DISTWIDTH] IN BLOOD BY AUTOMATED COUNT: 14.4 % (ref 10–15)
GFR SERPL CREATININE-BSD FRML MDRD: 86 ML/MIN/1.73M2
GLUCOSE SERPL-MCNC: 125 MG/DL (ref 70–99)
GLUCOSE UR STRIP-MCNC: NEGATIVE MG/DL
HCT VFR BLD AUTO: 41.1 % (ref 35–47)
HGB BLD-MCNC: 13.5 G/DL (ref 11.7–15.7)
HGB UR QL STRIP: NEGATIVE
KETONES UR STRIP-MCNC: NEGATIVE MG/DL
LEUKOCYTE ESTERASE UR QL STRIP: NEGATIVE
LIPASE SERPL-CCNC: 16 U/L (ref 13–60)
MCH RBC QN AUTO: 27.2 PG (ref 26.5–33)
MCHC RBC AUTO-ENTMCNC: 32.8 G/DL (ref 31.5–36.5)
MCV RBC AUTO: 83 FL (ref 78–100)
MUCOUS THREADS #/AREA URNS LPF: PRESENT /LPF
NITRATE UR QL: NEGATIVE
PH UR STRIP: 6 [PH] (ref 5–7)
PLATELET # BLD AUTO: 269 10E3/UL (ref 150–450)
POTASSIUM SERPL-SCNC: 4.7 MMOL/L (ref 3.4–5.3)
PROT SERPL-MCNC: 6.6 G/DL (ref 6.4–8.3)
RBC # BLD AUTO: 4.96 10E6/UL (ref 3.8–5.2)
RBC URINE: 2 /HPF
SODIUM SERPL-SCNC: 137 MMOL/L (ref 136–145)
SP GR UR STRIP: 1.02 (ref 1–1.03)
SQUAMOUS EPITHELIAL: 4 /HPF
UROBILINOGEN UR STRIP-MCNC: <2 MG/DL
WBC # BLD AUTO: 12.6 10E3/UL (ref 4–11)
WBC URINE: 9 /HPF

## 2023-05-06 PROCEDURE — 80053 COMPREHEN METABOLIC PANEL: CPT | Performed by: EMERGENCY MEDICINE

## 2023-05-06 PROCEDURE — 74177 CT ABD & PELVIS W/CONTRAST: CPT

## 2023-05-06 PROCEDURE — 36415 COLL VENOUS BLD VENIPUNCTURE: CPT | Performed by: EMERGENCY MEDICINE

## 2023-05-06 PROCEDURE — 99285 EMERGENCY DEPT VISIT HI MDM: CPT | Mod: 25

## 2023-05-06 PROCEDURE — 81001 URINALYSIS AUTO W/SCOPE: CPT | Performed by: EMERGENCY MEDICINE

## 2023-05-06 PROCEDURE — 82248 BILIRUBIN DIRECT: CPT | Performed by: EMERGENCY MEDICINE

## 2023-05-06 PROCEDURE — 87086 URINE CULTURE/COLONY COUNT: CPT | Performed by: EMERGENCY MEDICINE

## 2023-05-06 PROCEDURE — 250N000011 HC RX IP 250 OP 636: Performed by: EMERGENCY MEDICINE

## 2023-05-06 PROCEDURE — 83690 ASSAY OF LIPASE: CPT | Performed by: EMERGENCY MEDICINE

## 2023-05-06 PROCEDURE — 85027 COMPLETE CBC AUTOMATED: CPT | Performed by: EMERGENCY MEDICINE

## 2023-05-06 RX ORDER — IOPAMIDOL 755 MG/ML
100 INJECTION, SOLUTION INTRAVASCULAR ONCE
Status: COMPLETED | OUTPATIENT
Start: 2023-05-06 | End: 2023-05-06

## 2023-05-06 RX ADMIN — IOPAMIDOL 90 ML: 755 INJECTION, SOLUTION INTRAVENOUS at 19:32

## 2023-05-06 ASSESSMENT — ACTIVITIES OF DAILY LIVING (ADL): ADLS_ACUITY_SCORE: 35

## 2023-05-06 NOTE — ED PROVIDER NOTES
Emergency Department Encounter     Evaluation Date & Time:   No admission date for patient encounter.    CHIEF COMPLAINT:  Abdominal Pain and Nausea & Vomiting      Triage Note:  Pt c/o abdominal pain and nausea for past 2 hours.  Also, pt vomited once pta.  Pt denies diarrhea and denies constipation.  Pt denies any known injury.  Pt's last bowel movement was pta.  Pt took Dicyclomine 45 minutes pta with no relief.     Triage Assessment     Row Name [id number]       Triage Assessment (Adult)    Airway WDL WDL       Respiratory WDL    Respiratory WDL WDL       Skin Circulation/Temperature WDL    Skin Circulation/Temperature WDL WDL       Cardiac WDL    Cardiac WDL WDL       Peripheral/Neurovascular WDL    Peripheral Neurovascular WDL WDL       Cognitive/Neuro/Behavioral WDL    Cognitive/Neuro/Behavioral WDL WDL                    Impression and Plan       FINAL IMPRESSION:    ICD-10-CM    1. Abdominal pain, generalized  R10.84             ED COURSE & MEDICAL DECISION MAKIN:39 PM I met with the patient, obtained history, performed an initial exam, and discussed options and plan for diagnostics and treatment here in the ED.  8:35 PM Checked in on and updated patient. We discussed the plan for discharge and the patient is agreeable. Reviewed supportive cares, symptomatic treatment, outpatient follow up, and reasons to return to the Emergency Department. Patient to be discharged by ED RN.       57 year old female, history of SVT, heart murmur, DM2, GERD, asthma, ANTONIO, mental health diagnoses, s/p  section, s/p cholecystectomy, s/p hysterectomy and with perforated viscus s/p surgery, who presents for evaluation of sharp and squeezing periumbilical abdominal pain that has been constant since onset 2-3 hours ago. She reports associated nausea with one episode of vomiting; pain improved somewhat after vomiting. No diarrhea with normal BM this afternoon. No urinary symptoms or fevers.     She has had similar  pain in the past and was seen by GI ~3 weeks ago for it with no etiology determined.    On exam, abdomen soft with moderate tenderness to palpation of the supraumbilical region and epigastrium and mild tenderness to palpation diffusely otherwise; no peritoneal signs.    IV access established and blood sent for labs.    Labs remarkable for mild leukocytosis (WBC 12.6) with no anemia.  She has no significant electrolyte derangements or renal impairment.  No laboratory evidence of hepatitis, biliary obstruction or pancreatitis.    UA contaminated (4 epi cells's) with moderate bacteria and no other suggestion of infection.  Patient again denies any urinary symptoms and prefers to hold on antibiotics pending urine culture results.    CT abdomen / pelvis performed and demonstrated:  1.  Persistent two small linear metallic densities projecting along the greater curvature of the stomach since 04/22/2023. Correlate for prior gastric post procedural change / clips (perhaps less likely chronic retained foreign bodies).   2.  Stomach is decompressed, exaggerating wall thickness. Otherwise, unremarkable bowel and appendix. No obstruction.  3.  Mild hepatic steatosis.  4.  No free air. No abscess.    Patient discharged to home with follow-up with her Primary Care Provider this upcoming week.  Return precautions provided.  Patient stable throughout ED course.      At the conclusion of the encounter I discussed the results of all the tests and the disposition. The questions were answered. The patient and family acknowledged understanding and were agreeable with the care plan.      Medical Decision Making    History:    Supplemental history from: Documented in chart, if applicable    External Record(s) reviewed: Documented in chart, if applicable.    Work Up:    Chart documentation includes differential considered and any EKGs or imaging independently interpreted by provider, where specified.    In additional to work up documented,  "I considered the following work up: Documented in chart, if applicable.    External consultation:    Discussion of management with another provider: Documented in chart, if applicable    Complicating factors:    Care impacted by chronic illness: Diabetes, Mental Health and Other: GERD and asthma    Care affected by social determinants of health: N/A    Disposition considerations: Discharge. I recommended the patient continue their current prescription strength medication(s): for diabetes and GERD. I considered admission, but ultimately discharged patient Given reassuring vitals, exam and evaluation.    MEDICATIONS GIVEN IN THE EMERGENCY DEPARTMENT:  Medications   iopamidol (ISOVUE-370) solution 100 mL (90 mLs Intravenous $Given 23)       NEW PRESCRIPTIONS STARTED AT TODAY'S ED VISIT:  Discharge Medication List as of 2023  8:49 PM          HPI     HPI     Chris Carlin is a 57 year old female, history of SVT, heart murmur, DM2, GERD, asthma, ANTONIO, mental health diagnoses, s/p  section, s/p cholecystectomy, s/p hysterectomy and with perforated viscus s/p surgery, who presents to this ED by walk-in for evaluation of abdominal pain, nausea and vomiting.     Around 2-2.5 hours ago (~2:30-3:00 PM) patient developed onset of abdominal pain that has been constant since onset. Pain is located in the periumbilical region of her abdomen with radiation diffusely. The pain is sharp in nature and feels as though someone is \"grabbing my intestines\". No provocative features. Pain improved a little after she vomited once. Has only mild ongoing nausea. No diarrhea. Last BM this afternoon. No urinary symptoms. No fevers.     Of note, patient states that she has had similar pain in the past and she thought it was due to adhesions. She was seen by Gastroenterology but states nothing was found. Patient still has her appendix.     Otherwise patient denies chest pain, shortness of breath, cough or any other " concerns.    Per chart review: Patient had a CT of Abdomen and Pelvis with contrast performed on 2023 at [place]. Impression showed no appendicitis. Fatty liver. Previous cholecystectomy and hysterectomy. Otherwise unremarkable.       REVIEW OF SYSTEMS:  All other systems reviewed and are negative.      Medical History     Past Medical History:   Diagnosis Date     Acid reflux      Acne      Acute blood loss anemia      Adenomatous polyp of colon      Alcohol abuse, in remission      Allergic rhinitis      Anemia      Anemia      Asthma      Asthma      Cardiac murmur      Depression      Depression, major, recurrent (H)      Diabetes mellitus (H)      DJD (degenerative joint disease)      Genital warts      GERD (gastroesophageal reflux disease)      Heart murmur      Migraine      Migraine without intractable migraine      ANTONIO (obstructive sleep apnea)      Overactive bladder      Perforated, severe stomach ulcer (H)      Pre-diabetes      Prediabetes      Rosacea      Sleep apnea      SVT (supraventricular tachycardia) (H)        Past Surgical History:   Procedure Laterality Date     CERVICECTOMY (TRACHELECTOMY)  2012    Procedure: CERVICECTOMY (TRACHELECTOMY);  Vaginal Tracheolectomy;  Surgeon: Michelle Bravo MD;  Location: [facility] OR      SECTION      [email or web address]      SECTION       DILATION AND CURETTAGE       DILATION AND CURETTAGE        KNEE SCOPE,SINGLE MENISECTOMY Right 2015    Procedure: KNEE ARTHROSCOPY, RIGHT Removal of Loose Bodies and Partial Medial Meniscectomy ;  Surgeon: Ciro Frances MD;  Location: [facility];  Service: Orthopedics     HYSTERECTOMY       LAPAROSCOPIC HYSTERECTOMY SUPRACERVICAL  2011    ovaries retained     OTHER SURGICAL HISTORY      perforated stomach ulcer surgery     perforated stomach ulcer  2000    at [facility],      pubovaginal sling  2011     PUBOVAGINAL SLING       REPAIR BLADDER      as a child     TONSILLECTOMY        TONSILLECTOMY & ADENOIDECTOMY       ZZC HAND/FINGER SURGERY UNLISTED      x 3       Family History   Problem Relation Age of Onset     Sjogren's Mother      Lupus Other      Breast Cancer No family hx of      Cancer - colorectal No family hx of      Diabetes Mother      Anesthesia Reaction No family hx of        Social History     Tobacco Use     Smoking status: Never     Smokeless tobacco: Never   Substance Use Topics     Alcohol use: Yes     Comment: Alcoholic Drinks/day: Rare     Drug use: No       albuterol 90 MCG/ACT inhaler  cetirizine (ZYRTEC) 10 MG tablet  cholecalciferol (VITAMIN D3) 1000 UNITS capsule  coenzyme Q-10 200 MG CAPS  desoximetasone (TOPICORT) 0.25 % cream  hydroquinone 4 % CREA  ibuprofen (ADVIL,MOTRIN) 600 MG tablet  ISOtretinoin (ACCUTANE) 40 MG capsule  LANsoprazole (PREVACID SOLUTAB) 30 MG disintegrating tablet  metoprolol (TOPROL-XL) 25 MG 24 hr tablet  PARoxetine (PAXIL) 20 MG tablet  senna-docusate (SENOKOT-S;PERICOLACE) 8.6-50 MG per tablet  Simethicone 125 MG TABS        Physical Exam     First Vitals:  Patient Vitals for the past 24 hrs:   BP Temp Temp src Pulse Resp SpO2 Weight   05/06/23 2045 128/68 -- -- 81 -- 97 % --   05/06/23 2000 125/60 -- -- 79 -- 98 % --   05/06/23 1900 124/56 -- -- 76 -- 96 % --   05/06/23 1855 121/57 -- -- 73 -- 95 % --   05/06/23 1737 (!) 141/84 98  F (36.7  C) Oral 80 16 96 % 129.3 kg (285 lb)       PHYSICAL EXAM:   Physical Exam    GENERAL: Awake, alert.  In mild acute distress.   HEENT: Normocephalic, atraumatic. Pupils equal, round and reactive. Conjunctiva normal.   NECK: No stridor.  PULMONARY: Symmetrical breath sounds without distress.  Lungs clear to auscultation bilaterally without wheezes, rhonchi or rales.  CARDIO: Regular rate and rhythm.  No significant murmur, rub or gallop.  Radial pulses strong and symmetrical.  ABDOMINAL: Abdomen soft, non-distended with moderate tenderness to palpation supraumbilical region, and epigatrium and mild  tenderness to palpation diffusely otherwise; no rebound tenderness or guarding. No CVAT, BL.  EXTREMITIES: No lower extremity swelling or edema.      NEURO: Alert and oriented to person, place and time.  Cranial nerves grossly intact.  No focal motor deficit.  PSYCH: Normal mood and affect.      Results     LAB:  All pertinent labs reviewed and interpreted  Labs Ordered and Resulted from Time of ED Arrival to Time of ED Departure   ROUTINE UA WITH MICROSCOPIC REFLEX TO CULTURE - Abnormal       Result Value    Color Urine Light Yellow      Appearance Urine Clear      Glucose Urine Negative      Bilirubin Urine Negative      Ketones Urine Negative      Specific Gravity Urine 1.019      Blood Urine Negative      pH Urine 6.0      Protein Albumin Urine 10 (*)     Urobilinogen Urine <2.0      Nitrite Urine Negative      Leukocyte Esterase Urine Negative      Bacteria Urine Moderate (*)     Mucus Urine Present (*)     RBC Urine 2      WBC Urine 9 (*)     Squamous Epithelials Urine 4 (*)    CBC WITH PLATELETS - Abnormal    WBC Count 12.6 (*)     RBC Count 4.96      Hemoglobin 13.5      Hematocrit 41.1      MCV 83      MCH 27.2      MCHC 32.8      RDW 14.4      Platelet Count 269     BASIC METABOLIC PANEL - Abnormal    Sodium 137      Potassium 4.7      Chloride 103      Carbon Dioxide (CO2) 24      Anion Gap 10      Urea Nitrogen 12.3      Creatinine 0.79      Calcium 9.4      Glucose 125 (*)     GFR Estimate 86     HEPATIC FUNCTION PANEL - Normal    Protein Total 6.6      Albumin 3.6      Bilirubin Total 0.3      Alkaline Phosphatase 82      AST 31      ALT 21      Bilirubin Direct <0.20     LIPASE - Normal    Lipase 16         RADIOLOGY:  CT Abdomen Pelvis w Contrast   Final Result   IMPRESSION:       1.  Persistent two small linear metallic densities projecting along the greater curvature of the stomach since 04/22/2023. Correlate for prior gastric post procedural change / clips (perhaps less likely chronic retained  foreign bodies).       2.  Stomach is decompressed, exaggerating wall thickness. Otherwise, unremarkable bowel and appendix. No obstruction.      3.  Mild hepatic steatosis.      4.  No free air. No abscess.               I, Karen Cannon, am serving as a scribe to document services personally performed by Magnolia Bales MD based on my observation and the provider's statements to me. I, Magnolia Bales MD attest that Karen Cannon is acting in a scribe capacity, has observed my performance of the services and has documented them in accordance with my direction.    Magnolia Bales MD  Emergency Medicine  [facility] EMERGENCY DEPARTMENT         Magnolia Bales MD  05/07/23 [id number]

## 2023-05-06 NOTE — ED TRIAGE NOTES
Pt c/o abdominal pain and nausea for past 2 hours.  Also, pt vomited once pta.  Pt denies diarrhea and denies constipation.  Pt denies any known injury.  Pt's last bowel movement was pta.  Pt took Dicyclomine 45 minutes pta with no relief.     Triage Assessment     Row Name 05/06/23 2375       Triage Assessment (Adult)    Airway WDL WDL       Respiratory WDL    Respiratory WDL WDL       Skin Circulation/Temperature WDL    Skin Circulation/Temperature WDL WDL       Cardiac WDL    Cardiac WDL WDL       Peripheral/Neurovascular WDL    Peripheral Neurovascular WDL WDL       Cognitive/Neuro/Behavioral WDL    Cognitive/Neuro/Behavioral WDL WDL

## 2023-05-07 NOTE — DISCHARGE INSTRUCTIONS
Please follow-up with your Primary Care Provider within 3-5 days for a recheck; call to arrange appointment.    Return to the ER for worsening symptoms, worsening pain, persistent nausea / vomiting, fever or other concerns.

## 2023-05-08 LAB — BACTERIA UR CULT: NORMAL

## 2023-11-26 ENCOUNTER — HEALTH MAINTENANCE LETTER (OUTPATIENT)
Age: 58
End: 2023-11-26

## 2023-12-04 ENCOUNTER — APPOINTMENT (OUTPATIENT)
Dept: RADIOLOGY | Facility: HOSPITAL | Age: 58
End: 2023-12-04
Attending: EMERGENCY MEDICINE
Payer: COMMERCIAL

## 2023-12-04 ENCOUNTER — HOSPITAL ENCOUNTER (EMERGENCY)
Facility: HOSPITAL | Age: 58
Discharge: HOME | End: 2023-12-04
Attending: STUDENT IN AN ORGANIZED HEALTH CARE EDUCATION/TRAINING PROGRAM | Admitting: STUDENT IN AN ORGANIZED HEALTH CARE EDUCATION/TRAINING PROGRAM
Payer: COMMERCIAL

## 2023-12-04 VITALS
RESPIRATION RATE: 20 BRPM | WEIGHT: 285 LBS | HEIGHT: 64 IN | OXYGEN SATURATION: 100 % | TEMPERATURE: 98.4 F | SYSTOLIC BLOOD PRESSURE: 135 MMHG | HEART RATE: 81 BPM | BODY MASS INDEX: 48.65 KG/M2 | DIASTOLIC BLOOD PRESSURE: 71 MMHG

## 2023-12-04 DIAGNOSIS — J45.901 MILD ASTHMA WITH EXACERBATION, UNSPECIFIED WHETHER PERSISTENT: ICD-10-CM

## 2023-12-04 DIAGNOSIS — R05.9 COUGH, UNSPECIFIED TYPE: ICD-10-CM

## 2023-12-04 PROCEDURE — 250N000009 HC RX 250: Performed by: STUDENT IN AN ORGANIZED HEALTH CARE EDUCATION/TRAINING PROGRAM

## 2023-12-04 PROCEDURE — 94640 AIRWAY INHALATION TREATMENT: CPT

## 2023-12-04 PROCEDURE — 71046 X-RAY EXAM CHEST 2 VIEWS: CPT

## 2023-12-04 PROCEDURE — 99283 EMERGENCY DEPT VISIT LOW MDM: CPT | Mod: 25

## 2023-12-04 PROCEDURE — 250N000012 HC RX MED GY IP 250 OP 636 PS 637: Performed by: STUDENT IN AN ORGANIZED HEALTH CARE EDUCATION/TRAINING PROGRAM

## 2023-12-04 RX ORDER — PREDNISONE 20 MG/1
40 TABLET ORAL DAILY
Qty: 8 TABLET | Refills: 0 | Status: SHIPPED | OUTPATIENT
Start: 2023-12-04

## 2023-12-04 RX ORDER — ALBUTEROL SULFATE 0.83 MG/ML
5 SOLUTION RESPIRATORY (INHALATION) ONCE
Status: COMPLETED | OUTPATIENT
Start: 2023-12-04 | End: 2023-12-04

## 2023-12-04 RX ORDER — PREDNISONE 20 MG/1
40 TABLET ORAL DAILY
Qty: 8 TABLET | Refills: 0 | Status: SHIPPED | OUTPATIENT
Start: 2023-12-04 | End: 2023-12-04

## 2023-12-04 RX ORDER — PREDNISONE 20 MG/1
60 TABLET ORAL ONCE
Status: COMPLETED | OUTPATIENT
Start: 2023-12-04 | End: 2023-12-04

## 2023-12-04 RX ADMIN — ALBUTEROL SULFATE 5 MG: 2.5 SOLUTION RESPIRATORY (INHALATION) at 20:28

## 2023-12-04 RX ADMIN — PREDNISONE 60 MG: 20 TABLET ORAL at 20:08

## 2023-12-04 ASSESSMENT — ACTIVITIES OF DAILY LIVING (ADL): ADLS_ACUITY_SCORE: 35

## 2023-12-04 NOTE — ED TRIAGE NOTES
Pt has had a cough and congestion for 2 weeks. [facility] and [facility] sent pt here for a cxr. Sats 97%.  Pmh-asthma.

## 2023-12-05 NOTE — ED PROVIDER NOTES
EMERGENCY DEPARTMENT ENCOUNTER      NAME: Chris Carlin  AGE: 58 year old female  YOB: 1965  MRN: [id number]  EVALUATION DATE & TIME: 12/4/2023  7:09 PM    PCP: Eduardo Parnell    ED PROVIDER: Jamin Sanders MD      Chief Complaint   Patient presents with    Cough         FINAL IMPRESSION:  No diagnosis found.      ED COURSE & MEDICAL DECISION MAKING:    Pertinent Labs & Imaging studies reviewed. (See chart for details)  58 year old female presents to the Emergency Department for evaluation of cough.  Patient reports she has had a week or 2 of cough now started with congestion and mild sore throat.  Congestion and sore throat have since resolved but she still having lingering cough that is occasionally productive.  No fevers.  No significant associated chest pain.  No nausea or vomiting.  No swelling of the lower extremities.  No history of blood clots.  No known sick contacts.    On exam here patient is well-appearing in no acute distress.  She saturating well on room air.  She does have faint bilateral expiratory wheezes with good aeration down to the bases.    Suspect probable viral URI with potential underlying exacerbation of asthma.  Will obtain a chest x-ray to evaluate for any focal consolidation to suggest bacterial pneumonia given the duration of her cough.    Chest x-ray here does not show any acute cardiopulmonary pathology.  Patient did receive a albuterol breathing treatment and first dose of prednisone by mouth here in the emergency department with significant improvement in her wheezing on repeat evaluation.  Discussed the patient she likely have a lingering cough for at least the next few days to perhaps a few weeks.  However, with no new oxygen requirement  and improved wheezing after a dose of albuterol here in the emergency department believe she is safe for discharge home.  Will send her home with an additional 4 days of prednisone and recommend she schedule albuterol puffs every 4-6  hours at home for the next 2 days.  Otherwise if she develops significantly worsening shortness of breath, persistent fevers or other new or concerning symptoms should return to the emergency department for repeat evaluation.         7:51 PM I met and evaluated the patient.     Medical Decision Making    History:  Supplemental history from: Documented in chart, if applicable  External Record(s) reviewed: Documented in chart, if applicable.    Work Up:  Chart documentation includes differential considered and any EKGs or imaging independently interpreted by provider, where specified.  In additional to work up documented, I considered the following work up: Documented in chart, if applicable.    External consultation:  Discussion of management with another provider: Documented in chart, if applicable    Complicating factors:  Care impacted by chronic illness: Chronic Lung Disease  Care affected by social determinants of health: N/A    Disposition considerations: Discharge. I prescribed additional prescription strength medication(s) as charted. See documentation for any additional details.           At the conclusion of the encounter I discussed the results of all of the tests and the disposition. The questions were answered. The patient or family acknowledged understanding and was agreeable with the care plan.     0 minutes of critical care time     MEDICATIONS GIVEN IN THE EMERGENCY:  Medications   albuterol (PROVENTIL) neb solution 5 mg (has no administration in time range)   predniSONE (DELTASONE) tablet 60 mg (has no administration in time range)       NEW PRESCRIPTIONS STARTED AT TODAY'S ER VISIT  New Prescriptions    No medications on file          =================================================================    HPI    Patient information was obtained from: Patient     Use of : N/A       Chris Carlin is a 58 year old female with a pertinent history of mild intermittent asthma, ANTONIO (on CPAP),  "GERD, DM type II who presents to this ED by walk-in for evaluation of cough    The patient reports she was seen earlier today at the \"[facility]\" and told to come to the ED for further workup.   The patient reports she has had a cough for a couple weeks and has been unable to sleep because of it. The patient states sometimes the cough is productive. The patient notes a few weeks ago she had a sore throat and congestion, but notes those symptoms have improved. The patient notes her family members have also been sick, but with different symptoms.   The patient reports a history of mild intermittent asthma that only worsens when she is sick. The patient has been using the inhaler as needed. The patient denies taking HTN medication. The patient denies recent travel, nausea, vomiting, diarrhea, or any other symptoms or complaints.    Social history: The patient denies smoking      REVIEW OF SYSTEMS   Refer to the HPI    PAST MEDICAL HISTORY:  Past Medical History:   Diagnosis Date    Acid reflux     Acne     Acute blood loss anemia     Adenomatous polyp of colon     Alcohol abuse, in remission     Allergic rhinitis     Anemia     Anemia     Asthma     Asthma     Cardiac murmur     Depression     Depression, major, recurrent (H)     Diabetes mellitus (H)     pre diabetic    DJD (degenerative joint disease)     Genital warts     GERD (gastroesophageal reflux disease)     Heart murmur     Migraine     Migraine without intractable migraine     ANTONIO (obstructive sleep apnea)     Overactive bladder     Perforated, severe stomach ulcer (H)     Pre-diabetes     Prediabetes     Rosacea     Sleep apnea     CPAP nightly    SVT (supraventricular tachycardia) (H)     reports intermittent, occurs once per year       PAST SURGICAL HISTORY:  Past Surgical History:   Procedure Laterality Date    CERVICECTOMY (TRACHELECTOMY)  9/13/2012    Procedure: CERVICECTOMY (TRACHELECTOMY);  Vaginal Tracheolectomy;  Surgeon: Michelle Bravo MD;  " Location: [facility] OR     SECTION      [email or web address]     SECTION      DILATION AND CURETTAGE      DILATION AND CURETTAGE      HC KNEE SCOPE,SINGLE MENISECTOMY Right 2015    Procedure: KNEE ARTHROSCOPY, RIGHT Removal of Loose Bodies and Partial Medial Meniscectomy ;  Surgeon: Ciro Frances MD;  Location: [facility];  Service: Orthopedics    HYSTERECTOMY      LAPAROSCOPIC HYSTERECTOMY SUPRACERVICAL  2011    ovaries retained    OTHER SURGICAL HISTORY      perforated stomach ulcer surgery    perforated stomach ulcer  2000    at [facility],     pubovaginal sling  2011    PUBOVAGINAL SLING      REPAIR BLADDER      as a child    TONSILLECTOMY      TONSILLECTOMY & ADENOIDECTOMY      ZZC HAND/FINGER SURGERY UNLISTED      x 3           CURRENT MEDICATIONS:    albuterol 90 MCG/ACT inhaler  cetirizine (ZYRTEC) 10 MG tablet  cholecalciferol (VITAMIN D3) 1000 UNITS capsule  coenzyme Q-10 200 MG CAPS  desoximetasone (TOPICORT) 0.25 % cream  hydroquinone 4 % CREA  ibuprofen (ADVIL,MOTRIN) 600 MG tablet  ISOtretinoin (ACCUTANE) 40 MG capsule  LANsoprazole (PREVACID SOLUTAB) 30 MG disintegrating tablet  metoprolol (TOPROL-XL) 25 MG 24 hr tablet  PARoxetine (PAXIL) 20 MG tablet  senna-docusate (SENOKOT-S;PERICOLACE) 8.6-50 MG per tablet  Simethicone 125 MG TABS        ALLERGIES:  Allergies   Allergen Reactions    Nitrofurantoin Hives and Rash    Penicillin G Difficulty breathing    Sulfa Antibiotics Hives       FAMILY HISTORY:  Family History   Problem Relation Age of Onset    Sjogren's Mother     Lupus Other     Breast Cancer No family hx of     Cancer - colorectal No family hx of     Diabetes Mother     Anesthesia Reaction No family hx of        SOCIAL HISTORY:   Social History     Socioeconomic History    Marital status: Single   Tobacco Use    Smoking status: Never    Smokeless tobacco: Never   Substance and Sexual Activity    Alcohol use: Yes     Comment: Alcoholic Drinks/day: Rare    Drug  "use: No    Sexual activity: Never     Partners: Male       VITALS:  /76   Pulse 82   Temp 98.4  F (36.9  C) (Tympanic)   Resp 20   Ht 1.626 m (5' 4\")   Wt 129.3 kg (285 lb)   SpO2 97%   BMI 48.92 kg/m      PHYSICAL EXAM    Constitutional: Well developed, Well nourished, NAD,  HENT: Normocephalic, Atraumatic,  mucous membranes moist,   Neck- trachea midline, No stridor.    Eyes:EOMI, Conjunctiva normal, No discharge.   Respiratory: No respiratory distress, Speaks full sentences easily. Faint bilateral expiratory wheezes, but otherwise good air movement     Cardiovascular: Normal heart rate, Regular rhythm,  No murmurs, No rubs, No gallops. Chest wall nontender.    Abdominal: Soft, No tenderness, No rebound or guarding.     Musculoskeletal: No edema. No cyanosis,   Integument: Warm, Dry, No erythema, No rash.   Neurologic: Alert & oriented x 3   Psychiatric: Affect normal, Judgment normal, Mood normal. Cooperative.      LAB:  All pertinent labs reviewed and interpreted.  Results for orders placed or performed during the hospital encounter of 12/04/23   Chest XR,  PA & LAT    Impression    IMPRESSION: Negative chest.       RADIOLOGY:  Reviewed all pertinent imaging. Please see official radiology report.  Chest XR,  PA & LAT   Final Result   IMPRESSION: Negative chest.          [facility] Documentation:   CMS Diagnoses:              I, Alisa Jett, am serving as a scribe to document services personally performed by Jamin Sanders MD based on my observation and the provider's statements to me. I, Jamin Sanders MD, attest that Alisa Jett is acting in a scribe capacity, has observed my performance of the services and has documented them in accordance with my direction.    Jamin Sanders MD  [facility] EMERGENCY DEPARTMENT  [address] [id number] [phone]      Jamin Sanders MD  12/04/23 2327    "

## 2024-04-14 ENCOUNTER — HEALTH MAINTENANCE LETTER (OUTPATIENT)
Age: 59
End: 2024-04-14

## 2025-04-19 ENCOUNTER — HEALTH MAINTENANCE LETTER (OUTPATIENT)
Age: 60
End: 2025-04-19

## 2025-08-02 ENCOUNTER — HEALTH MAINTENANCE LETTER (OUTPATIENT)
Age: 60
End: 2025-08-02